# Patient Record
Sex: MALE | Race: WHITE | NOT HISPANIC OR LATINO | Employment: FULL TIME | URBAN - METROPOLITAN AREA
[De-identification: names, ages, dates, MRNs, and addresses within clinical notes are randomized per-mention and may not be internally consistent; named-entity substitution may affect disease eponyms.]

---

## 2022-05-25 ENCOUNTER — OFFICE VISIT (OUTPATIENT)
Dept: URGENT CARE | Facility: CLINIC | Age: 47
End: 2022-05-25
Payer: COMMERCIAL

## 2022-05-25 VITALS
DIASTOLIC BLOOD PRESSURE: 87 MMHG | SYSTOLIC BLOOD PRESSURE: 140 MMHG | HEIGHT: 70 IN | OXYGEN SATURATION: 98 % | BODY MASS INDEX: 35.79 KG/M2 | HEART RATE: 81 BPM | TEMPERATURE: 98 F | WEIGHT: 250 LBS | RESPIRATION RATE: 18 BRPM

## 2022-05-25 DIAGNOSIS — B34.9 ACUTE VIRAL SYNDROME: ICD-10-CM

## 2022-05-25 DIAGNOSIS — J02.9 ACUTE SORE THROAT: ICD-10-CM

## 2022-05-25 DIAGNOSIS — R09.81 COUGH WITH CONGESTION OF PARANASAL SINUS: ICD-10-CM

## 2022-05-25 DIAGNOSIS — Z20.822 OCCUPATIONAL EXPOSURE TO COVID-19 VIRUS: Primary | ICD-10-CM

## 2022-05-25 DIAGNOSIS — R09.82 POSTNASAL DRIP: ICD-10-CM

## 2022-05-25 DIAGNOSIS — R05.8 COUGH WITH CONGESTION OF PARANASAL SINUS: ICD-10-CM

## 2022-05-25 LAB
CTP QC/QA: YES
SARS-COV-2 RDRP RESP QL NAA+PROBE: NEGATIVE

## 2022-05-25 PROCEDURE — 3079F PR MOST RECENT DIASTOLIC BLOOD PRESSURE 80-89 MM HG: ICD-10-PCS | Mod: CPTII,S$GLB,, | Performed by: PHYSICIAN ASSISTANT

## 2022-05-25 PROCEDURE — U0002: ICD-10-PCS | Mod: QW,S$GLB,, | Performed by: PHYSICIAN ASSISTANT

## 2022-05-25 PROCEDURE — 4010F ACE/ARB THERAPY RXD/TAKEN: CPT | Mod: CPTII,S$GLB,, | Performed by: PHYSICIAN ASSISTANT

## 2022-05-25 PROCEDURE — 3008F PR BODY MASS INDEX (BMI) DOCUMENTED: ICD-10-PCS | Mod: CPTII,S$GLB,, | Performed by: PHYSICIAN ASSISTANT

## 2022-05-25 PROCEDURE — 3008F BODY MASS INDEX DOCD: CPT | Mod: CPTII,S$GLB,, | Performed by: PHYSICIAN ASSISTANT

## 2022-05-25 PROCEDURE — 3077F SYST BP >= 140 MM HG: CPT | Mod: CPTII,S$GLB,, | Performed by: PHYSICIAN ASSISTANT

## 2022-05-25 PROCEDURE — 1159F PR MEDICATION LIST DOCUMENTED IN MEDICAL RECORD: ICD-10-PCS | Mod: CPTII,S$GLB,, | Performed by: PHYSICIAN ASSISTANT

## 2022-05-25 PROCEDURE — 3077F PR MOST RECENT SYSTOLIC BLOOD PRESSURE >= 140 MM HG: ICD-10-PCS | Mod: CPTII,S$GLB,, | Performed by: PHYSICIAN ASSISTANT

## 2022-05-25 PROCEDURE — 99214 PR OFFICE/OUTPT VISIT, EST, LEVL IV, 30-39 MIN: ICD-10-PCS | Mod: S$GLB,,, | Performed by: PHYSICIAN ASSISTANT

## 2022-05-25 PROCEDURE — U0002 COVID-19 LAB TEST NON-CDC: HCPCS | Mod: QW,S$GLB,, | Performed by: PHYSICIAN ASSISTANT

## 2022-05-25 PROCEDURE — 1160F PR REVIEW ALL MEDS BY PRESCRIBER/CLIN PHARMACIST DOCUMENTED: ICD-10-PCS | Mod: CPTII,S$GLB,, | Performed by: PHYSICIAN ASSISTANT

## 2022-05-25 PROCEDURE — 3079F DIAST BP 80-89 MM HG: CPT | Mod: CPTII,S$GLB,, | Performed by: PHYSICIAN ASSISTANT

## 2022-05-25 PROCEDURE — 1159F MED LIST DOCD IN RCRD: CPT | Mod: CPTII,S$GLB,, | Performed by: PHYSICIAN ASSISTANT

## 2022-05-25 PROCEDURE — 1160F RVW MEDS BY RX/DR IN RCRD: CPT | Mod: CPTII,S$GLB,, | Performed by: PHYSICIAN ASSISTANT

## 2022-05-25 PROCEDURE — 99214 OFFICE O/P EST MOD 30 MIN: CPT | Mod: S$GLB,,, | Performed by: PHYSICIAN ASSISTANT

## 2022-05-25 PROCEDURE — 4010F PR ACE/ARB THEARPY RXD/TAKEN: ICD-10-PCS | Mod: CPTII,S$GLB,, | Performed by: PHYSICIAN ASSISTANT

## 2022-05-25 RX ORDER — METOPROLOL SUCCINATE 50 MG/1
TABLET, EXTENDED RELEASE ORAL
COMMUNITY

## 2022-05-25 RX ORDER — AMLODIPINE BESYLATE 10 MG/1
TABLET ORAL
COMMUNITY

## 2022-05-25 RX ORDER — GABAPENTIN 100 MG/1
CAPSULE ORAL
COMMUNITY

## 2022-05-25 RX ORDER — ATORVASTATIN CALCIUM 40 MG/1
TABLET, FILM COATED ORAL
COMMUNITY

## 2022-05-25 RX ORDER — LISINOPRIL 20 MG/1
TABLET ORAL
COMMUNITY

## 2022-05-25 NOTE — PATIENT INSTRUCTIONS
*Your COVID test was negative however, you have a known exposure:     [facility] COVID QUARANTINE   CDC Testing and Quarantine Guidelines for patients with exposure to a known-positive COVID-19 person:    *A 'close exposure' is defined as anyone who has had an exposure (masked or unmasked) to a known COVID -19 positive person within 6 feet of someone for a cumulative total of 15 minutes or more over a 24-hour period.    - Vaccinated Have been boosted or completed the primary series of Pfizer or Moderna vaccine within the last 6 months or completed the primary series of J&J vaccine within the last 2 months and/or had a positive test within 90 days-- do NOT need to quarantine after contact with someone who had COVID-19 unless they have symptoms.    - Fully vaccinated people who have not had a positive test within 90 days, should get tested 3-5 days after their exposure, even if they don't have symptoms and wear a mask indoors in public for 10 days following exposure or until their test result is negative on day 5. If you develop symptoms test and quarantine.    If you do decide to test at 5 days and are asymptomatic, the risk is that if you test without symptoms on Day 5 for example) and you are positive, your 5 day isolation begins on that day, and you turned your 5 day quarantine into 10 days.    If your exposure does not meet the above definition, you can return to your normal daily activities to include social distancing, wearing a mask and frequent handwashing.    Alternatively, if a 5-day quarantine is not feasible, it is imperative that an exposed person wear a well-fitting mask at all times when around others for 10 days after exposure.       SORE THROAT:    You may gargle with hot salt water 4 times a day for the next 2 days and then you may also gargle diluted hydrogen peroxide once to twice daily to alleviate some of your throat discomfort.  Drink plenty of fluids, recommend warm tea with honey.     YOU MAY USE  OVER-THE-COUNTER CEPACOL FOR SOOTHING OF YOUR THROAT.  You may wish to avoid spicy food, citrus fruits, and red sauces- as this may irritate the throat more.    You can also take a daily anti-histamine such as Zyrtec, Claritin, Xyzal, OR Allegra-IN DAYTIME; NON DROWSY) AND/OR Benadryl- AT NIGHT; DROWSY) to help with runny nose/sneezing/sore throat/cough.    If your symptoms do not improve, you should return to this clinic. If your symptoms worsen, go to the emergency room.     VIRAL URI: OVER THE COUNTER RECOMMENDATIONS/SUGGESTIONS--if needed    Remember to switch antihistamines every 3 months, if taken daily.     Make sure to stay well hydrated.    Use Nasal Saline to mechanically move any post nasal drip from your eustachian tube or from the back of your throat.    You may insert a whole garlic cloves into your nostrils and leave for 10-15 minutes. When you remove them, mucus will be pulled down. This may burn as garlic is strong.  Repeat as often as needed and able to tolerate.  Please do not use garlic if you have an allergy to garlic.    Use pseudoephedrine (behind the counter) to decongest. Pseudoephedrine  30 mg up to 240 mg /day. *BE AWARE- It can raise your blood pressure and give you palpitations.  Use mucinex (guaifenisin) to break up mucous up to 2400mg/day to loosen any mucous.   The mucinex DM pill has a cough suppressant that can be sedating. It can be used at night to stop the tickle at the back of your throat.  You can use Mucinex D (it has guaifenesin and a high dose of pseudoephedrine) in the mornings to help decongest.    Sometimes Nyquil at night is beneficial to help you get some rest, however it is sedating and it does have an antihistamine and tylenol.    Honey is a natural cough suppressant that can be used.    Tylenol up to 4,000 mg a day is safe for short periods and can be used for headache, body aches, pain, and fever. However in high doses and prolonged use it can cause liver  irritation.    Ibuprofen is a non-steroidal anti-inflammatory that can be used for headache, body aches, pain, and fever.However it can also cause stomach irritation if over used.        - You must understand that you have received an Urgent Care treatment only and that you may be released before all of your medical problems are known or treated.   - You, the patient, will arrange for follow up care as instructed with your primary care provider or recommended specialist.   - If your condition worsens or fails to improve we recommend that you receive another evaluation at the ER immediately or contact your PCP to discuss your concerns, or return here.   - Please do not drive or make any important decisions for 24 hours if you have received any pain medications, sedatives or mood altering drugs during your visit.    Disclaimer: This document was drafted with the use of a voice recognition device and is likely to have sound alike errors.

## 2022-05-25 NOTE — LETTER
[address] [facility], [address]  [place] [address]  Phone: [phone]          Return to Work/School    Patient: Husam Huerta  YOB: 1975   Date: 05/25/2022     To Whom It May Concern:     Husam Huerta was in contact with/seen in my office on 05/25/2022. COVID-19 is present in our communities across the state. There is limited testing for COVID at this time, so not all patients can be tested. In this situation, your employee meets the following criteria:     Husam Huerta has met the criteria for COVID-19 testing and has a NEGATIVE result. The employee can return to work once they are asymptomatic for 24 hours without the use of fever reducing medications (Tylenol, Motrin, etc).     If you have any questions or concerns, or if I can be of further assistance, please do not hesitate to contact me.     Sincerely,    Luisa Rosales PA-C

## 2022-05-25 NOTE — PROGRESS NOTES
"Subjective:       Patient ID: Husam Huerta is a 46 y.o. male.    Vitals:  height is 5' 10" (1.778 m) and weight is 113.4 kg (250 lb). His tympanic temperature is 97.9 °F (36.6 °C). His blood pressure is 140/87 (abnormal) and his pulse is 81. His respiration is 18 and oxygen saturation is 98%.     Chief Complaint: Cough    Pt exposed to covid through coworker yesterday.  Pt c/o cough, sore throat, sinus pressure, postnasal drip this morning.     Cough  This is a new problem. The current episode started today. The problem has been unchanged. The problem occurs hourly. The cough is non-productive. Associated symptoms include nasal congestion, postnasal drip and a sore throat. Pertinent negatives include no chest pain, chills, ear congestion, ear pain, fever, headaches, heartburn, hemoptysis, myalgias, rash, rhinorrhea, shortness of breath, sweats, weight loss or wheezing. Nothing aggravates the symptoms. He has tried nothing for the symptoms. The treatment provided no relief. There is no history of asthma, bronchiectasis, bronchitis, COPD, emphysema, environmental allergies or pneumonia.       Constitution: Negative for chills and fever.   HENT: Positive for congestion, postnasal drip and sore throat. Negative for ear pain.    Cardiovascular: Negative for chest pain.   Respiratory: Positive for cough. Negative for bloody sputum, shortness of breath and wheezing.    Gastrointestinal: Negative for heartburn.   Musculoskeletal: Negative for muscle ache.   Skin: Negative for rash.   Allergic/Immunologic: Negative for environmental allergies.   Neurological: Negative for headaches.       Objective:       Vitals:    05/25/22 1059   BP: (!) 140/87   Pulse: 81   Resp: 18   Temp: 97.9 °F (36.6 °C)   TempSrc: Tympanic   SpO2: 98%   Weight: 113.4 kg (250 lb)   Height: 5' 10" (1.778 m)       Physical Exam   Constitutional: He is oriented to person, place, and time. He appears well-developed. He is cooperative.  Non-toxic " appearance. He appears ill. No distress.   HENT:   Head: Normocephalic and atraumatic.   Ears:   Right Ear: Hearing, tympanic membrane, external ear and ear canal normal.   Left Ear: Hearing, tympanic membrane, external ear and ear canal normal.   Nose: Congestion present. No mucosal edema, rhinorrhea or nasal deformity. No epistaxis. Right sinus exhibits no maxillary sinus tenderness and no frontal sinus tenderness. Left sinus exhibits no maxillary sinus tenderness and no frontal sinus tenderness.   Mouth/Throat: Uvula is midline and mucous membranes are normal. Mucous membranes are moist. No trismus in the jaw. Normal dentition. No uvula swelling. Posterior oropharyngeal erythema present. No oropharyngeal exudate. Oropharynx is clear.   Eyes: Conjunctivae and lids are normal. Pupils are equal, round, and reactive to light. Right eye exhibits no discharge. Left eye exhibits no discharge. No scleral icterus. Extraocular movement intact   Neck: Trachea normal and phonation normal. Neck supple. No neck rigidity present.   Cardiovascular: Normal rate, regular rhythm, normal heart sounds and normal pulses.   Pulmonary/Chest: Effort normal and breath sounds normal. No stridor. No respiratory distress. He has no wheezes. He exhibits no tenderness.   Abdominal: Normal appearance and bowel sounds are normal. He exhibits no distension and no mass. Soft. There is no abdominal tenderness. There is no rebound and no guarding.   Musculoskeletal: Normal range of motion.         General: No deformity. Normal range of motion.      Right lower leg: No edema.      Left lower leg: No edema.   Lymphadenopathy:     He has no cervical adenopathy.   Neurological: no focal deficit. He is alert, oriented to person, place, and time and at baseline. He exhibits normal muscle tone. Coordination normal.   Skin: Skin is warm, dry, intact, not diaphoretic, not pale and no rash. Capillary refill takes less than 2 seconds.   Psychiatric: His speech  is normal and behavior is normal. Judgment and thought content normal.   Nursing note and vitals reviewed.        Assessment:       1. Occupational exposure to COVID-19 virus    2. Cough with congestion of paranasal sinus    3. Acute sore throat    4. Postnasal drip    5. Acute viral syndrome      No results found for this or any previous visit.      Plan:         Occupational exposure to COVID-19 virus    Cough with congestion of paranasal sinus  -     POCT COVID-19 Rapid Screening    Acute sore throat    Postnasal drip    Acute viral syndrome          Patient Instructions   *Your COVID test was negative however, you have a known exposure:     [facility] COVID QUARANTINE   CDC Testing and Quarantine Guidelines for patients with exposure to a known-positive COVID-19 person:    *A 'close exposure' is defined as anyone who has had an exposure (masked or unmasked) to a known COVID -19 positive person within 6 feet of someone for a cumulative total of 15 minutes or more over a 24-hour period.    - Vaccinated Have been boosted or completed the primary series of Pfizer or Moderna vaccine within the last 6 months or completed the primary series of J&J vaccine within the last 2 months and/or had a positive test within 90 days-- do NOT need to quarantine after contact with someone who had COVID-19 unless they have symptoms.    - Fully vaccinated people who have not had a positive test within 90 days, should get tested 3-5 days after their exposure, even if they don't have symptoms and wear a mask indoors in public for 10 days following exposure or until their test result is negative on day 5. If you develop symptoms test and quarantine.    - Unvaccinated, or are more than six months out from their second mRNA dose (or more than 2 months after the J&J vaccine) and not yet boosted,  and/or NOT had a positive test within 90 days and meet 'close exposure-- you are required by CDC guidelines to quarantine for at least 5 days from time  of exposure followed by 5 days of strict masking. It is recommended, but not required to test after 5 days, unless you develop symptoms, in which case you should test at that time.    If you do decide to test at 5 days and are asymptomatic, the risk is that if you test without symptoms on Day 5 for example) and you are positive, your 5 day isolation begins on that day, and you turned your 5 day quarantine into 10 days.    If your exposure does not meet the above definition, you can return to your normal daily activities to include social distancing, wearing a mask and frequent handwashing.    Alternatively, if a 5-day quarantine is not feasible, it is imperative that an exposed person wear a well-fitting mask at all times when around others for 10 days after exposure.       SORE THROAT:    You may gargle with hot salt water 4 times a day for the next 2 days and then you may also gargle diluted hydrogen peroxide once to twice daily to alleviate some of your throat discomfort.  Drink plenty of fluids, recommend warm tea with honey.     YOU MAY USE OVER-THE-COUNTER CEPACOL FOR SOOTHING OF YOUR THROAT.  You may wish to avoid spicy food, citrus fruits, and red sauces- as this may irritate the throat more.    You can also take a daily anti-histamine such as Zyrtec, Claritin, Xyzal, OR Allegra-IN DAYTIME; NON DROWSY) AND/OR Benadryl- AT NIGHT; DROWSY) to help with runny nose/sneezing/sore throat/cough.    If your symptoms do not improve, you should return to this clinic. If your symptoms worsen, go to the emergency room.     VIRAL URI: OVER THE COUNTER RECOMMENDATIONS/SUGGESTIONS--if needed    Remember to switch antihistamines every 3 months, if taken daily.     Make sure to stay well hydrated.    Use Nasal Saline to mechanically move any post nasal drip from your eustachian tube or from the back of your throat.    You may insert a whole garlic cloves into your nostrils and leave for 10-15 minutes. When you remove them,  mucus will be pulled down. This may burn as garlic is strong.  Repeat as often as needed and able to tolerate.  Please do not use garlic if you have an allergy to garlic.    Use pseudoephedrine (behind the counter) to decongest. Pseudoephedrine  30 mg up to 240 mg /day. *BE AWARE- It can raise your blood pressure and give you palpitations.  Use mucinex (guaifenisin) to break up mucous up to 2400mg/day to loosen any mucous.   The mucinex DM pill has a cough suppressant that can be sedating. It can be used at night to stop the tickle at the back of your throat.  You can use Mucinex D (it has guaifenesin and a high dose of pseudoephedrine) in the mornings to help decongest.    Sometimes Nyquil at night is beneficial to help you get some rest, however it is sedating and it does have an antihistamine and tylenol.    Honey is a natural cough suppressant that can be used.    Tylenol up to 4,000 mg a day is safe for short periods and can be used for headache, body aches, pain, and fever. However in high doses and prolonged use it can cause liver irritation.    Ibuprofen is a non-steroidal anti-inflammatory that can be used for headache, body aches, pain, and fever.However it can also cause stomach irritation if over used.        - You must understand that you have received an Urgent Care treatment only and that you may be released before all of your medical problems are known or treated.   - You, the patient, will arrange for follow up care as instructed with your primary care provider or recommended specialist.   - If your condition worsens or fails to improve we recommend that you receive another evaluation at the ER immediately or contact your PCP to discuss your concerns, or return here.   - Please do not drive or make any important decisions for 24 hours if you have received any pain medications, sedatives or mood altering drugs during your visit.    Disclaimer: This document was drafted with the use of a voice  recognition device and is likely to have sound alike errors.         Medical Decision Making:   Initial Assessment:   Too early for COVID test which was negative  --quarantine instructions discussed

## 2022-12-03 ENCOUNTER — OFFICE VISIT (OUTPATIENT)
Dept: URGENT CARE | Facility: CLINIC | Age: 47
End: 2022-12-03
Payer: COMMERCIAL

## 2022-12-03 VITALS
BODY MASS INDEX: 37.03 KG/M2 | HEIGHT: 69 IN | SYSTOLIC BLOOD PRESSURE: 144 MMHG | WEIGHT: 250 LBS | RESPIRATION RATE: 18 BRPM | HEART RATE: 101 BPM | OXYGEN SATURATION: 96 % | DIASTOLIC BLOOD PRESSURE: 90 MMHG | TEMPERATURE: 101 F

## 2022-12-03 DIAGNOSIS — J10.1 INFLUENZA A: Primary | ICD-10-CM

## 2022-12-03 DIAGNOSIS — R05.1 ACUTE COUGH: ICD-10-CM

## 2022-12-03 LAB
CTP QC/QA: YES
CTP QC/QA: YES
POC MOLECULAR INFLUENZA A AGN: POSITIVE
POC MOLECULAR INFLUENZA B AGN: NEGATIVE
SARS-COV-2 RDRP RESP QL NAA+PROBE: NEGATIVE

## 2022-12-03 PROCEDURE — 3077F SYST BP >= 140 MM HG: CPT | Mod: CPTII,S$GLB,, | Performed by: PHYSICIAN ASSISTANT

## 2022-12-03 PROCEDURE — 87635: ICD-10-PCS | Mod: QW,S$GLB,, | Performed by: PHYSICIAN ASSISTANT

## 2022-12-03 PROCEDURE — 99214 PR OFFICE/OUTPT VISIT, EST, LEVL IV, 30-39 MIN: ICD-10-PCS | Mod: S$GLB,,, | Performed by: PHYSICIAN ASSISTANT

## 2022-12-03 PROCEDURE — 87502 POCT INFLUENZA A/B MOLECULAR: ICD-10-PCS | Mod: QW,S$GLB,, | Performed by: PHYSICIAN ASSISTANT

## 2022-12-03 PROCEDURE — 4010F PR ACE/ARB THEARPY RXD/TAKEN: ICD-10-PCS | Mod: CPTII,S$GLB,, | Performed by: PHYSICIAN ASSISTANT

## 2022-12-03 PROCEDURE — 4010F ACE/ARB THERAPY RXD/TAKEN: CPT | Mod: CPTII,S$GLB,, | Performed by: PHYSICIAN ASSISTANT

## 2022-12-03 PROCEDURE — 3077F PR MOST RECENT SYSTOLIC BLOOD PRESSURE >= 140 MM HG: ICD-10-PCS | Mod: CPTII,S$GLB,, | Performed by: PHYSICIAN ASSISTANT

## 2022-12-03 PROCEDURE — 1160F PR REVIEW ALL MEDS BY PRESCRIBER/CLIN PHARMACIST DOCUMENTED: ICD-10-PCS | Mod: CPTII,S$GLB,, | Performed by: PHYSICIAN ASSISTANT

## 2022-12-03 PROCEDURE — 1160F RVW MEDS BY RX/DR IN RCRD: CPT | Mod: CPTII,S$GLB,, | Performed by: PHYSICIAN ASSISTANT

## 2022-12-03 PROCEDURE — 87635 SARS-COV-2 COVID-19 AMP PRB: CPT | Mod: QW,S$GLB,, | Performed by: PHYSICIAN ASSISTANT

## 2022-12-03 PROCEDURE — 1159F PR MEDICATION LIST DOCUMENTED IN MEDICAL RECORD: ICD-10-PCS | Mod: CPTII,S$GLB,, | Performed by: PHYSICIAN ASSISTANT

## 2022-12-03 PROCEDURE — 1159F MED LIST DOCD IN RCRD: CPT | Mod: CPTII,S$GLB,, | Performed by: PHYSICIAN ASSISTANT

## 2022-12-03 PROCEDURE — 99214 OFFICE O/P EST MOD 30 MIN: CPT | Mod: S$GLB,,, | Performed by: PHYSICIAN ASSISTANT

## 2022-12-03 PROCEDURE — 3008F PR BODY MASS INDEX (BMI) DOCUMENTED: ICD-10-PCS | Mod: CPTII,S$GLB,, | Performed by: PHYSICIAN ASSISTANT

## 2022-12-03 PROCEDURE — 3008F BODY MASS INDEX DOCD: CPT | Mod: CPTII,S$GLB,, | Performed by: PHYSICIAN ASSISTANT

## 2022-12-03 PROCEDURE — 3080F PR MOST RECENT DIASTOLIC BLOOD PRESSURE >= 90 MM HG: ICD-10-PCS | Mod: CPTII,S$GLB,, | Performed by: PHYSICIAN ASSISTANT

## 2022-12-03 PROCEDURE — 87502 INFLUENZA DNA AMP PROBE: CPT | Mod: QW,S$GLB,, | Performed by: PHYSICIAN ASSISTANT

## 2022-12-03 PROCEDURE — 3080F DIAST BP >= 90 MM HG: CPT | Mod: CPTII,S$GLB,, | Performed by: PHYSICIAN ASSISTANT

## 2022-12-03 RX ORDER — BENZONATATE 200 MG/1
200 CAPSULE ORAL 3 TIMES DAILY PRN
Qty: 21 CAPSULE | Refills: 0 | Status: SHIPPED | OUTPATIENT
Start: 2022-12-03 | End: 2022-12-10

## 2022-12-03 RX ORDER — OSELTAMIVIR PHOSPHATE 75 MG/1
75 CAPSULE ORAL 2 TIMES DAILY
Qty: 10 CAPSULE | Refills: 0 | Status: SHIPPED | OUTPATIENT
Start: 2022-12-03 | End: 2022-12-08

## 2022-12-03 NOTE — PROGRESS NOTES
"Subjective:       Patient ID: Husam Huerta is a 46 y.o. male.    Vitals:  height is 5' 9" (1.753 m) and weight is 113.4 kg (250 lb). His oral temperature is 101.1 °F (38.4 °C) (abnormal). His blood pressure is 144/90 (abnormal) and his pulse is 101. His respiration is 18 and oxygen saturation is 96%.     Chief Complaint: Cough    Pt c/o eye pressure, headache, nasal congestion, stuffy head/sinus pressure, cough, postnasal drip starting yesterday.     Cough  This is a new problem. The current episode started yesterday. The problem has been gradually worsening. The problem occurs every few hours. The cough is Non-productive. Associated symptoms include chills, a fever, headaches, myalgias, nasal congestion and postnasal drip. Pertinent negatives include no chest pain, ear congestion, ear pain, heartburn, hemoptysis, rash, rhinorrhea, sore throat, shortness of breath, sweats, weight loss or wheezing. Nothing aggravates the symptoms. Risk factors for lung disease include smoking/tobacco exposure and animal exposure. Treatments tried: nyquil, zithromax. The treatment provided no relief. There is no history of asthma, bronchiectasis, bronchitis, COPD, emphysema, environmental allergies or pneumonia.     Constitution: Positive for chills and fever.   HENT:  Positive for postnasal drip. Negative for ear pain and sore throat.    Cardiovascular:  Negative for chest pain.   Respiratory:  Positive for cough. Negative for bloody sputum, shortness of breath and wheezing.    Gastrointestinal:  Negative for heartburn.   Musculoskeletal:  Positive for muscle ache.   Skin:  Negative for rash.   Allergic/Immunologic: Negative for environmental allergies.   Neurological:  Positive for headaches.     Objective:      Physical Exam   Constitutional: He is oriented to person, place, and time. He appears well-developed. He is cooperative.  Non-toxic appearance. He does not appear ill. No distress.   HENT:   Head: Normocephalic and " atraumatic.   Ears:   Right Ear: Hearing, tympanic membrane, external ear and ear canal normal.   Left Ear: Hearing, tympanic membrane, external ear and ear canal normal.   Nose: Nose normal. No mucosal edema, rhinorrhea or nasal deformity. No epistaxis. Right sinus exhibits no maxillary sinus tenderness and no frontal sinus tenderness. Left sinus exhibits no maxillary sinus tenderness and no frontal sinus tenderness.   Mouth/Throat: Uvula is midline, oropharynx is clear and moist and mucous membranes are normal. No trismus in the jaw. Normal dentition. No uvula swelling. No posterior oropharyngeal erythema.   Eyes: Conjunctivae and lids are normal. Right eye exhibits no discharge. Left eye exhibits no discharge. No scleral icterus.   Neck: Trachea normal and phonation normal. Neck supple.   Cardiovascular: Normal rate, regular rhythm, normal heart sounds and normal pulses.   Pulmonary/Chest: Effort normal and breath sounds normal. No respiratory distress. He has no wheezes. He has no rhonchi.   Abdominal: Normal appearance and bowel sounds are normal. He exhibits no distension and no mass. Soft. There is no abdominal tenderness.   Musculoskeletal: Normal range of motion.         General: No deformity. Normal range of motion.   Neurological: He is alert and oriented to person, place, and time. He exhibits normal muscle tone. Coordination normal.   Skin: Skin is warm, dry, intact, not diaphoretic and not pale.   Psychiatric: His speech is normal and behavior is normal. Judgment and thought content normal.   Nursing note and vitals reviewed.      Assessment:       1. Influenza A    2. Acute cough        Results for orders placed or performed in visit on 12/03/22   POCT COVID-19 Rapid Screening   Result Value Ref Range    POC Rapid COVID Negative Negative     Acceptable Yes    POCT Influenza A/B MOLECULAR   Result Value Ref Range    POC Molecular Influenza A Ag Positive (A) Negative, Not Reported    POC  Molecular Influenza B Ag Negative Negative, Not Reported     Acceptable Yes        Plan:       Discussed medication being prescribed.  Advised patient to follow up with PCP as needed.  Patient verbalized understanding, agrees with the plan, and is comfortable with discharge.    Influenza A  -     oseltamivir (TAMIFLU) 75 MG capsule; Take 1 capsule (75 mg total) by mouth 2 (two) times daily. for 5 days  Dispense: 10 capsule; Refill: 0    Acute cough  -     POCT COVID-19 Rapid Screening  -     benzonatate (TESSALON) 200 MG capsule; Take 1 capsule (200 mg total) by mouth 3 (three) times daily as needed for Cough.  Dispense: 21 capsule; Refill: 0  -     POCT Influenza A/B MOLECULAR

## 2022-12-06 ENCOUNTER — TELEPHONE (OUTPATIENT)
Dept: URGENT CARE | Facility: CLINIC | Age: 47
End: 2022-12-06
Payer: COMMERCIAL

## 2025-04-24 ENCOUNTER — OFFICE VISIT (OUTPATIENT)
Dept: URGENT CARE | Facility: CLINIC | Age: 50
End: 2025-04-24
Payer: COMMERCIAL

## 2025-04-24 VITALS
RESPIRATION RATE: 20 BRPM | DIASTOLIC BLOOD PRESSURE: 77 MMHG | SYSTOLIC BLOOD PRESSURE: 129 MMHG | BODY MASS INDEX: 37.22 KG/M2 | HEART RATE: 88 BPM | HEIGHT: 70 IN | OXYGEN SATURATION: 95 % | TEMPERATURE: 98 F | WEIGHT: 260 LBS

## 2025-04-24 DIAGNOSIS — M54.50 ACUTE LOW BACK PAIN WITHOUT SCIATICA, UNSPECIFIED BACK PAIN LATERALITY: Primary | ICD-10-CM

## 2025-04-24 LAB
BILIRUBIN, UA POC OHS: NEGATIVE
BLOOD, UA POC OHS: ABNORMAL
CLARITY, UA POC OHS: ABNORMAL
COLOR, UA POC OHS: ABNORMAL
GLUCOSE, UA POC OHS: NEGATIVE
KETONES, UA POC OHS: NEGATIVE
LEUKOCYTES, UA POC OHS: NEGATIVE
NITRITE, UA POC OHS: NEGATIVE
PH, UA POC OHS: 6.5
PROTEIN, UA POC OHS: ABNORMAL
SPECIFIC GRAVITY, UA POC OHS: 1.02
UROBILINOGEN, UA POC OHS: 0.2

## 2025-04-24 RX ORDER — PREDNISONE 20 MG/1
60 TABLET ORAL
Status: COMPLETED | OUTPATIENT
Start: 2025-04-24 | End: 2025-04-24

## 2025-04-24 RX ORDER — KETOROLAC TROMETHAMINE 30 MG/ML
30 INJECTION, SOLUTION INTRAMUSCULAR; INTRAVENOUS
Status: COMPLETED | OUTPATIENT
Start: 2025-04-24 | End: 2025-04-24

## 2025-04-24 RX ORDER — KETOROLAC TROMETHAMINE 30 MG/ML
2 INJECTION, SOLUTION INTRAMUSCULAR; INTRAVENOUS
COMMUNITY

## 2025-04-24 RX ORDER — HYDROCODONE BITARTRATE AND ACETAMINOPHEN 5; 325 MG/1; MG/1
1 TABLET ORAL EVERY 6 HOURS PRN
Qty: 12 TABLET | Refills: 0 | Status: SHIPPED | OUTPATIENT
Start: 2025-04-24

## 2025-04-24 RX ADMIN — PREDNISONE 60 MG: 20 TABLET ORAL at 01:04

## 2025-04-24 RX ADMIN — KETOROLAC TROMETHAMINE 30 MG: 30 INJECTION, SOLUTION INTRAMUSCULAR; INTRAVENOUS at 01:04

## 2025-04-24 NOTE — PROGRESS NOTES
"Subjective:      Patient ID: Husam Huerta is a 49 y.o. male.    Vitals:  height is 5' 10" (1.778 m) and weight is 117.9 kg (260 lb). His oral temperature is 98.3 °F (36.8 °C). His blood pressure is 129/77 and his pulse is 88. His respiration is 20 and oxygen saturation is 95%.     Chief Complaint: Back Pain    Patient presents with left lower back pain that began 4/21/25. He was moving a dresser on 4/20/25 and thinks that is what caused this flare.  Pt took [place], pain level is 8/10.  Denies numbness/tingling, radiation of pain, and/or any other symptoms associated with this complaint.     Back Pain  This is a recurrent problem. The current episode started in the past 7 days. The problem has been rapidly worsening since onset. The pain is present in the sacro-iliac and lumbar spine. The quality of the pain is described as aching, burning, shooting and stabbing. The pain radiates to the left thigh. The pain is at a severity of 8/10. The pain is mild. The pain is The same all the time. The symptoms are aggravated by standing, twisting, sitting and position. Stiffness is present All day. Associated symptoms include pelvic pain and tingling. Pertinent negatives include no abdominal pain, bladder incontinence, bowel incontinence, chest pain, dysuria, fever, headaches, leg pain, numbness, paresis, paresthesias, perianal numbness, weakness or weight loss. The treatment provided moderate relief.       Constitution: Negative for fever.   Cardiovascular:  Negative for chest pain.   Gastrointestinal:  Negative for abdominal pain and bowel incontinence.   Genitourinary:  Positive for pelvic pain. Negative for dysuria and bladder incontinence.   Musculoskeletal:  Positive for back pain.   Neurological:  Negative for headaches and numbness.      Objective:     Physical Exam   Constitutional: He is oriented to person, place, and time. He appears well-developed.   HENT:   Head: Normocephalic and atraumatic.   Ears:   Right Ear: " External ear normal.   Left Ear: External ear normal.   Nose: Nose normal.   Mouth/Throat: Oropharynx is clear and moist.   Eyes: Conjunctivae, EOM and lids are normal.   Neck: Trachea normal and phonation normal. Neck supple.   Cardiovascular: Normal rate.   Pulmonary/Chest: Effort normal.   Musculoskeletal: Normal range of motion.         General: Normal range of motion.      Thoracic back: Normal.      Lumbar back: He exhibits no bony tenderness.        Back:    Neurological: He is alert and oriented to person, place, and time.   Skin: Skin is warm, dry and intact.   Psychiatric: His speech is normal and behavior is normal. Judgment and thought content normal.   Nursing note and vitals reviewed.      Assessment:     1. Acute low back pain without sciatica, unspecified back pain laterality      Results for orders placed or performed in visit on 04/24/25   POCT Urinalysis(Instrument)    Collection Time: 04/24/25  1:28 PM   Result Value Ref Range    Color, POC UA Tierra (A) Yellow, Straw, Colorless    Clarity, POC UA Cloudy (A) Clear    Glucose, POC UA Negative Negative    Bilirubin, POC UA Negative Negative    Ketones, POC UA Negative Negative    Spec Grav POC UA 1.025 1.005 - 1.030    Blood, POC UA Trace-intact (A) Negative    pH, POC UA 6.5 5.0 - 8.0    Protein, POC UA Trace (A) Negative    Urobilinogen, POC UA 0.2 <=1.0    Nitrite, POC UA Negative Negative    WBC, POC UA Negative Negative       Plan:   VSS. Patient non-toxic appearing. Discussed medication being prescribed.  Advised patient to follow up with PCP as needed.  Patient verbalized understanding, agrees with the plan, and is comfortable with discharge.      Acute low back pain without sciatica, unspecified back pain laterality  -     POCT Urinalysis(Instrument)  -     ketorolac injection 30 mg  -     predniSONE tablet 60 mg  -     HYDROcodone-acetaminophen (NORCO) 5-325 mg per tablet; Take 1 tablet by mouth every 6 (six) hours as needed for Pain.   Dispense: 12 tablet; Refill: 0      Medical Decision Making:   Clinical Tests:   Lab Tests: Ordered and Reviewed  The following lab test(s) were unremarkable: Urinalysis

## 2025-05-12 DIAGNOSIS — M48.062 SPINAL STENOSIS, LUMBAR REGION, WITH NEUROGENIC CLAUDICATION: ICD-10-CM

## 2025-05-12 DIAGNOSIS — M54.50 LOW BACK PAIN: Primary | ICD-10-CM

## 2025-05-12 DIAGNOSIS — M54.16 RADICULOPATHY, LUMBAR REGION: ICD-10-CM

## 2025-05-15 ENCOUNTER — CLINICAL SUPPORT (OUTPATIENT)
Dept: REHABILITATION | Facility: HOSPITAL | Age: 50
End: 2025-05-15
Payer: COMMERCIAL

## 2025-05-15 DIAGNOSIS — G89.29 CHRONIC BILATERAL LOW BACK PAIN WITHOUT SCIATICA: Primary | ICD-10-CM

## 2025-05-15 DIAGNOSIS — M54.50 CHRONIC BILATERAL LOW BACK PAIN WITHOUT SCIATICA: Primary | ICD-10-CM

## 2025-05-15 PROCEDURE — 97162 PT EVAL MOD COMPLEX 30 MIN: CPT | Mod: PN

## 2025-05-15 PROCEDURE — 97110 THERAPEUTIC EXERCISES: CPT | Mod: PN

## 2025-05-15 NOTE — LETTER
May 16, 2025  Ladan Monroe MD  [address]  [address]  [place] [address]    To whom it may concern,     The attached plan of care is being sent to you for review and reference.    You may indicate your approval by signing the document electronically, or by faxing/mailing a signed copy of the final page of this document back to the attention of Mayela Rodriguez, PT:         Plan of Care 5/16/25   Effective from: 5/16/2025  Effective to: 7/11/2025    Plan ID: [id number]            Participants as of Finalize on 5/16/2025    Name Type Comments Contact Info    Ladan Monroe MD Referring Provider  [phone]    Mayela Rodriguez PT Physical Therapist  [phone]       Last Plan Note     Author: Mayela Rodriguez PT Status: Signed Last edited: 5/15/2025 10:00 AM       Outpatient Rehab  Physical Therapy Evaluation    Patient Name: Husam Huerta  MRN: [id number]  YOB: 1975  Encounter Date: 5/15/2025    Therapy Diagnosis:   Encounter Diagnosis   Name Primary?    Chronic bilateral low back pain without sciatica Yes     Physician: Ladan Monroe MD    Physician Orders: Eval and Treat  Medical Diagnosis: Low back pain  Radiculopathy, lumbar region  Spinal stenosis, lumbar region, with neurogenic claudication    Visit # / Visits Authorized:  1 / 1  Insurance Authorization Period: 5/12/2025 to 12/31/2025  Date of Evaluation: 5/15/2025  Plan of Care Certification: 5/15/2025 to 7/11/2025     Time In: 1000   Time Out: 1100  Total Time (in minutes): 60   Total Billable Time (in minutes):  60 minutes    Intake Outcome Measure for FOTO Survey  Therapist reviewed FOTO scores for Husam Huerta on 5/15/2025.   FOTO report - see Media section or FOTO account episode details.   Intake Score: 49%    Precautions: Standard     Subjective   History of Present Illness  Husam is a 49 y.o. male who reports to physical therapy with a chief concern of Bilateral low back pain.         Diagnostic tests related to this  "condition: None.      Dominant Hand: Right  History of Present Condition/Illness: He reports that he is coming to physical therapy to get further imaging done. He reports that he hurt his back 20 years ago. He was digging a ditch and he hit bricks and the machine came out of the ditch and he was still holding onto the machine. He was on the couch for two weeks due to much pain and was told he has two torn discs. He had some at home therapy for a few days and was able to function. The more he did the more it would hurt. Has been good the last 9 months, but somewhere around [facility] he helped moved some stuff and aggravated his back again. States he could barely move and had a hard time getting up/out of the bed, getting dressed, moving around, land laying down. He has bilateral low back pain, right greater than left and no radiating symptoms. He reports that if he pushes it, he may get some radiating symptoms into RLE but does not go into foot. Today he reports that the hardest thing to do is to stand with a slight bend, such as: doing dishes, brushing teeth, shaving. He also has increased pain with prolonged sitting, prolonged standing, and can occastionally wake him up at night on flare ups he is unable to sleep.     Activities of Daily Living  Social history was obtained from Patient.       Previously independent with activities of daily living? Yes  Currently independent with activities of daily living? Yes     Previously independent with instrumental activities of daily living? Yes  Currently independent with instrumental activities of daily living? Yes        Pain  Patient reports a current pain level of 3/10. Pain at best is reported as 3/10. Pain at worst is reported as 9/10.   Location: bilateral low back pain (R>L)  Pain Qualities: Aching, Burning, Dull, Sharp, Other (Comment)  Other Pain Qualities: shooting, "nail stuck in back"  Pain-Relieving Factors: Lying down, Rest, Stretching  Pain-Aggravating Factors: " "Bending, Cooking, Exercise, Lifting, Rotation, Sitting, Sleeping, Squatting, Stair climbing, Standing, Walking     Living Arrangements  Living Situation  Housing: Home independently  Living Arrangements: Spouse/significant other    Home Setup  Type of Structure: House    Employment  Employment Status: Employed full-time    Sales- standing and sitting and walking    Past Medical History/Physical Systems Review:   Husam Huerta  has a past medical history of Hyperlipidemia and Hypertension.    Husam Huerta  has no past surgical history on file.    Husam has a current medication list which includes the following prescription(s): amlodipine, atorvastatin, gabapentin, hydrocodone-acetaminophen, ketorolac, lisinopril, and metoprolol succinate.    Review of patient's allergies indicates:  No Known Allergies     Objective      Lower Extremity Sensation  General Lumbar/Lower Extremity Sensation  Intact: Right and Left      Spinal Muscle Palpation  Slight tenderness noted in right lumbar paraspinals and QL       Lumbar Range of Motion   Active (%) Pain   Flexion 75   Haleigh's sign  Pain at 50% of the 75% range of motion    Extension 100   tightness   Right Lateral Flexion 100   Left tightness   Right Rotation 100     Left Lateral Flexion 100   Right tightness   Left Rotation 100          Single Leg Balance Test  Right Foot: greater than 15 sec, increased right lateral trunk lean   Left Foot: greater than 15 sec    Functional Mobility Test   Step downs: no pain, deviation, or compensation bilaterally       Treatment:  Therapeutic Exercise  TE 1: LTR 1x10  TE 2: Single knee to chest 20x  TE 3: piriformis stretch 3x30"  TE 4: Hamstring stretch 3x30"  TE 5: open book 1x10  TE 6: PPT 10", 2x10  TE 7: PPT with march 2x10  TE 8: Deadbugs 1x10  TE 9: birddogs 1x10       Assessment & Plan   Assessment  Husam presents with a condition of Moderate complexity.   Presentation of Symptoms: Uncomplicated  Will Comorbidities Impact Care: " No     Functional Limitations: Activity tolerance, Disrupted sleep pattern, Pain with ADLs/IADLs, Painful locomotion/ambulation, Participating in leisure activities, Range of motion, Sitting tolerance, Standing tolerance, Functional mobility  Impairments: Abnormal or restricted range of motion, Impaired physical strength, Pain with functional activity  Personal Factors Affecting Prognosis: Pain    Patient Goal for Therapy (PT): decreased pain  Prognosis: Good    Plan  From a physical therapy perspective, the patient would benefit from: Skilled Rehab Services    Planned therapy interventions include: Therapeutic exercise, Therapeutic activities, Neuromuscular re-education, and Manual therapy.    Planned modalities to include: Electrical stimulation - attended, Cryotherapy (cold pack), Electrical stimulation - passive/unattended, and Thermotherapy (hot pack).        Visit Frequency: 2 times Per Week for 8 Weeks.  This plan was discussed with Patient.   Discussion participants: Agreed Upon Plan of Care    Patient's spiritual, cultural, and educational needs considered and patient agreeable to plan of care and goals.       Goals:   Active       Long Term Goals       Patient will demonstrate improved function as indicated by a functional score of 66 on the FOTO.        Start:  05/15/25    Expected End:  07/11/25            Pt will demonstrate improved pain by reports of less than or equal to 5/10 worst pain on the verbal rating scale in order to progress toward maximal functional ability and improve QOL.         Start:  05/15/25    Expected End:  07/11/25            Patient will improve lumbar range of motion to within normal limits with less than 3/10 pain for improved functional QOL.        Start:  05/15/25    Expected End:  07/11/25            Patient will improve bilateral lower extremity strength to 5/5 MMT for return to prior level of function.        Start:  05/15/25    Expected End:  07/11/25               Short  Term Goals       Patient will demonstrate independence with HEP in order to progress toward functional independence        Start:  05/15/25    Expected End:  07/11/25            Pt will demonstrate improved pain by reports of less than or equal to 7/10 worst pain on the verbal rating scale in order to progress toward maximal functional ability and improve QOL.         Start:  05/15/25    Expected End:  07/11/25            Patient will reports decreased pain in the mornings for improved functional QOL       Start:  05/15/25    Expected End:  07/11/25                Mayela Rodriguez PT         Current Participants as of 5/16/2025    Name Type Comments Contact Info    Ladan Monroe MD Referring Provider  [phone]    Signature pending    Mayela Rodriguez PT Physical Therapist  [phone]                Sincerely,      Mayela Rodriguez PT  [facility]                                                            Dear Mayela Rodriguez PT,    RE: Mr. Husam Huerta, MRN: [id number]    I certify that I have reviewed the attached plan of care and agree to the details within.        ___________________________  ___________________________  Provider Printed Name   Provider Signed Name      ___________________________  Date and Time

## 2025-05-16 PROBLEM — M54.50 CHRONIC BILATERAL LOW BACK PAIN WITHOUT SCIATICA: Status: ACTIVE | Noted: 2025-05-16

## 2025-05-16 PROBLEM — G89.29 CHRONIC BILATERAL LOW BACK PAIN WITHOUT SCIATICA: Status: ACTIVE | Noted: 2025-05-16

## 2025-05-16 NOTE — PROGRESS NOTES
Outpatient Rehab  Physical Therapy Evaluation    Patient Name: Husam Huerta  MRN: [id number]  YOB: 1975  Encounter Date: 5/15/2025    Therapy Diagnosis:   Encounter Diagnosis   Name Primary?    Chronic bilateral low back pain without sciatica Yes     Physician: Ladan Monroe MD    Physician Orders: Eval and Treat  Medical Diagnosis: Low back pain  Radiculopathy, lumbar region  Spinal stenosis, lumbar region, with neurogenic claudication    Visit # / Visits Authorized:  1 / 1  Insurance Authorization Period: 5/12/2025 to 12/31/2025  Date of Evaluation: 5/15/2025  Plan of Care Certification: 5/15/2025 to 7/11/2025     Time In: 1000   Time Out: 1100  Total Time (in minutes): 60   Total Billable Time (in minutes):  60 minutes    Intake Outcome Measure for FOTO Survey  Therapist reviewed FOTO scores for Husam Huerta on 5/15/2025.   FOTO report - see Media section or FOTO account episode details.   Intake Score: 49%    Precautions: Standard     Subjective   History of Present Illness  Husam is a 49 y.o. male who reports to physical therapy with a chief concern of Bilateral low back pain.         Diagnostic tests related to this condition: None.      Dominant Hand: Right  History of Present Condition/Illness: He reports that he is coming to physical therapy to get further imaging done. He reports that he hurt his back 20 years ago. He was digging a ditch and he hit bricks and the machine came out of the ditch and he was still holding onto the machine. He was on the couch for two weeks due to much pain and was told he has two torn discs. He had some at home therapy for a few days and was able to function. The more he did the more it would hurt. Has been good the last 9 months, but [place] stuff and aggravated his back again. States he could barely move and had a hard time getting up/out of the bed, getting dressed, moving around, land laying down. He has bilateral low  "back pain, right greater than left and no radiating symptoms. He reports that if he pushes it, he may get some radiating symptoms into RLE but does not go into foot. Today he reports that the hardest thing to do is to stand with a slight bend, such as: doing dishes, brushing teeth, shaving. He also has increased pain with prolonged sitting, prolonged standing, and can occastionally wake him up at night on flare ups he is unable to sleep.     Activities of Daily Living  Social history was obtained from Patient.       Previously independent with activities of daily living? Yes  Currently independent with activities of daily living? Yes     Previously independent with instrumental activities of daily living? Yes  Currently independent with instrumental activities of daily living? Yes        Pain  Patient reports a current pain level of 3/10. Pain at best is reported as 3/10. Pain at worst is reported as 9/10.   Location: bilateral low back pain (R>L)  Pain Qualities: Aching, Burning, Dull, Sharp, Other (Comment)  Other Pain Qualities: shooting, "nail stuck in back"  Pain-Relieving Factors: Lying down, Rest, Stretching  Pain-Aggravating Factors: Bending, Cooking, Exercise, Lifting, Rotation, Sitting, Sleeping, Squatting, Stair climbing, Standing, Walking     Living Arrangements  Living Situation  Housing: Home independently  Living Arrangements: Spouse/significant other    Home Setup  Type of Structure: House    Employment  Employment Status: Employed full-time    Sales- standing and sitting and walking    Past Medical History/Physical Systems Review:   Husam Huerta  has a past medical history of Hyperlipidemia and Hypertension.    Husam Huerta  has no past surgical history on file.    Husam has a current medication list which includes the following prescription(s): amlodipine, atorvastatin, gabapentin, hydrocodone-acetaminophen, ketorolac, lisinopril, and metoprolol succinate.    Review of patient's allergies " "indicates:  No Known Allergies     Objective      Lower Extremity Sensation  General Lumbar/Lower Extremity Sensation  Intact: Right and Left      Spinal Muscle Palpation  Slight tenderness noted in right lumbar paraspinals and QL       Lumbar Range of Motion   Active (%) Pain   Flexion 75   Blair's sign  Pain at 50% of the 75% range of motion    Extension 100   tightness   Right Lateral Flexion 100   Left tightness   Right Rotation 100     Left Lateral Flexion 100   Right tightness   Left Rotation 100          Single Leg Balance Test  Right Foot: greater than 15 sec, increased right lateral trunk lean   Left Foot: greater than 15 sec    Functional Mobility Test   Step downs: no pain, deviation, or compensation bilaterally       Treatment:  Therapeutic Exercise  TE 1: LTR 1x10  TE 2: Single knee to chest 20x  TE 3: piriformis stretch 3x30"  TE 4: Hamstring stretch 3x30"  TE 5: open book 1x10  TE 6: PPT 10", 2x10  TE 7: PPT with march 2x10  TE 8: Deadbugs 1x10  TE 9: birddogs 1x10       Assessment & Plan   Assessment  Husam presents with a condition of Moderate complexity.   Presentation of Symptoms: Uncomplicated  Will Comorbidities Impact Care: No     Functional Limitations: Activity tolerance, Disrupted sleep pattern, Pain with ADLs/IADLs, Painful locomotion/ambulation, Participating in leisure activities, Range of motion, Sitting tolerance, Standing tolerance, Functional mobility  Impairments: Abnormal or restricted range of motion, Impaired physical strength, Pain with functional activity  Personal Factors Affecting Prognosis: Pain    Patient Goal for Therapy (PT): decreased pain  Prognosis: Good    Plan  From a physical therapy perspective, the patient would benefit from: Skilled Rehab Services    Planned therapy interventions include: Therapeutic exercise, Therapeutic activities, Neuromuscular re-education, and Manual therapy.    Planned modalities to include: Electrical stimulation - attended, Cryotherapy " (cold pack), Electrical stimulation - passive/unattended, and Thermotherapy (hot pack).        Visit Frequency: 2 times Per Week for 8 Weeks.  This plan was discussed with Patient.   Discussion participants: Agreed Upon Plan of Care    Patient's spiritual, cultural, and educational needs considered and patient agreeable to plan of care and goals.       Goals:   Active       Long Term Goals       Patient will demonstrate improved function as indicated by a functional score of 66 on the FOTO.        Start:  05/15/25    Expected End:  07/11/25            Pt will demonstrate improved pain by reports of less than or equal to 5/10 worst pain on the verbal rating scale in order to progress toward maximal functional ability and improve QOL.         Start:  05/15/25    Expected End:  07/11/25            Patient will improve lumbar range of motion to within normal limits with less than 3/10 pain for improved functional QOL.        Start:  05/15/25    Expected End:  07/11/25            Patient will improve bilateral lower extremity strength to 5/5 MMT for return to prior level of function.        Start:  05/15/25    Expected End:  07/11/25               Short Term Goals       Patient will demonstrate independence with HEP in order to progress toward functional independence        Start:  05/15/25    Expected End:  07/11/25            Pt will demonstrate improved pain by reports of less than or equal to 7/10 worst pain on the verbal rating scale in order to progress toward maximal functional ability and improve QOL.         Start:  05/15/25    Expected End:  07/11/25            Patient will reports decreased pain in the mornings for improved functional QOL       Start:  05/15/25    Expected End:  07/11/25                Mayela Rodriguez, PT

## 2025-05-22 ENCOUNTER — CLINICAL SUPPORT (OUTPATIENT)
Dept: REHABILITATION | Facility: HOSPITAL | Age: 50
End: 2025-05-22
Payer: COMMERCIAL

## 2025-05-22 DIAGNOSIS — M54.50 CHRONIC BILATERAL LOW BACK PAIN WITHOUT SCIATICA: Primary | ICD-10-CM

## 2025-05-22 DIAGNOSIS — G89.29 CHRONIC BILATERAL LOW BACK PAIN WITHOUT SCIATICA: Primary | ICD-10-CM

## 2025-05-22 PROCEDURE — 97110 THERAPEUTIC EXERCISES: CPT | Mod: PN

## 2025-05-22 PROCEDURE — 97112 NEUROMUSCULAR REEDUCATION: CPT | Mod: PN

## 2025-05-22 PROCEDURE — 97140 MANUAL THERAPY 1/> REGIONS: CPT | Mod: PN

## 2025-05-22 NOTE — PROGRESS NOTES
"Outpatient Rehab  Physical Therapy Visit    Patient Name: Husam Huerta  MRN: [id number]  YOB: 1975  Encounter Date: 5/22/2025    Therapy Diagnosis:   Encounter Diagnosis   Name Primary?    Chronic bilateral low back pain without sciatica Yes     Physician: Ladan Monroe MD    Physician Orders: Eval and Treat  Medical Diagnosis: Low back pain  Radiculopathy, lumbar region  Spinal stenosis, lumbar region, with neurogenic claudication    Visit # / Visits Authorized:  1 / 20  Insurance Authorization Period: 5/20/2025 to 12/31/2025  Date of Evaluation: 5/15/2025  Plan of Care Certification: 5/16/2025 to 7/11/2025      PT/PTA: PT   Number of PTA visits since last PT visit:0    Time In: 1000   Time Out: 1100  Total Time (in minutes): 60   Total Billable Time (in minutes):      Precautions: Standard       Subjective   having much right sided tightness and low back pain today..       Objective    Last assessed on eval (5/15/25)     Treatment:    Therapeutic Exercise: 40 minutes  Bike x 8 minutes  3-way forward flexion  Bridges 3x10  SLR 2x10 ea  Side-lying clams, GTB 20x ea  Hamstring stretch 3 x30"  Piriformis stretch 3x30"    Manual Therapy: 10 minutes  FDN to R lumbar paraspinals      Balance/Neuromuscular Re-Education: 10 minutes  Deadbugs 2x10  Shotgun x2 minutes    Therapeutic Activity       Assessment & Plan   Assessment: patient presents to his first follow-up after initial evaluation where POC was initiated. interventions focused on lumbar mobility and stabilization. Much tightness noted with bridges and SLR exercsies but able to complete all repetitions. Following [facility] verbal and written consent, FDN was performed to right low back with good tolerance. Slight relief noted following. Educated to apply moist heat for decreased soreness. Will inquire response at next session.     The patient will continue to benefit from skilled outpatient physical therapy in order to address the deficits listed in " the problem list on the initial evaluation, provide patient and family education, and maximize the patients level of independence in the home and community environments.     The patient's spiritual, cultural, and educational needs were considered, and the patient is agreeable to the plan of care and goals.         Plan: Continue with POC and progress as toelrated.    Goals:   Active       Long Term Goals       Patient will demonstrate improved function as indicated by a functional score of 66 on the FOTO.        Start:  05/15/25    Expected End:  07/11/25            Pt will demonstrate improved pain by reports of less than or equal to 5/10 worst pain on the verbal rating scale in order to progress toward maximal functional ability and improve QOL.         Start:  05/15/25    Expected End:  07/11/25            Patient will improve lumbar range of motion to within normal limits with less than 3/10 pain for improved functional QOL.        Start:  05/15/25    Expected End:  07/11/25            Patient will improve bilateral lower extremity strength to 5/5 MMT for return to prior level of function.        Start:  05/15/25    Expected End:  07/11/25               Short Term Goals       Patient will demonstrate independence with HEP in order to progress toward functional independence        Start:  05/15/25    Expected End:  07/11/25            Pt will demonstrate improved pain by reports of less than or equal to 7/10 worst pain on the verbal rating scale in order to progress toward maximal functional ability and improve QOL.         Start:  05/15/25    Expected End:  07/11/25            Patient will reports decreased pain in the mornings for improved functional QOL       Start:  05/15/25    Expected End:  07/11/25                Mayela Rodriguez, PT

## 2025-05-27 ENCOUNTER — CLINICAL SUPPORT (OUTPATIENT)
Dept: REHABILITATION | Facility: HOSPITAL | Age: 50
End: 2025-05-27
Payer: COMMERCIAL

## 2025-05-27 DIAGNOSIS — G89.29 CHRONIC BILATERAL LOW BACK PAIN WITHOUT SCIATICA: Primary | ICD-10-CM

## 2025-05-27 DIAGNOSIS — M54.50 CHRONIC BILATERAL LOW BACK PAIN WITHOUT SCIATICA: Primary | ICD-10-CM

## 2025-05-27 PROCEDURE — 97112 NEUROMUSCULAR REEDUCATION: CPT | Mod: PN

## 2025-05-27 PROCEDURE — 97110 THERAPEUTIC EXERCISES: CPT | Mod: PN

## 2025-05-27 NOTE — PROGRESS NOTES
"Outpatient Rehab  Physical Therapy Visit    Patient Name: Husam Huerta  MRN: [id number]  YOB: 1975  Encounter Date: 5/27/2025    Therapy Diagnosis:   Encounter Diagnosis   Name Primary?    Chronic bilateral low back pain without sciatica Yes     Physician: Ladan Monroe MD    Physician Orders: Eval and Treat  Medical Diagnosis: Low back pain  Radiculopathy, lumbar region  Spinal stenosis, lumbar region, with neurogenic claudication    Visit # / Visits Authorized:  2 / 20  Insurance Authorization Period: 5/20/2025 to 12/31/2025  Date of Evaluation: 5/15/2025  Plan of Care Certification: 5/16/2025 to 7/11/2025      PT/PTA: PT   Number of PTA visits since last PT visit:0    Time In: 0900   Time Out: 0950  Total Time (in minutes): 50   Total Billable Time (in minutes):  50 minutes (pt had to leave early)     Precautions: Standard     Subjective   Feeling overall good today. He had much soreness for about two days after his last session, but was able to mow the grass yesterday with no problems..       Objective    Last assessed on eval (5/15/25)     Treatment:    Therapeutic Exercise: 25 minutes  Bike x 8 minutes  3-way forward flexion  LTR with SB 20x  DKTC with SB 20x   Bridges 3x10  SLR 2x10 ea  Side-lying clams, GTB 20x ea  Hamstring stretch 3 x30"  Piriformis stretch 3x30"    Manual Therapy: 0 minutes  FDN to R lumbar paraspinals    Balance/Neuromuscular Re-Education: 25 minutes  Deadbugs 2x10  Shotgun x2 minutes  Tandem stance 2x30"  Hip 3-way 1 x10   Palloff press, 10# 2x10ea  Around the worlds, 10# 10x ea  Standing marches, 10# 2x10    Therapeutic Activity       Assessment & Plan   Assessment: He presents with decreased voerall low back pain. Interventions focused on lumbar mobility and stabilization. Much tightness noted with bridges and SLR exercsies but able to complete all repetitions. Following [place] verbal and written consent, FDN was performed to right low back with good tolerance. Slight " relief noted following. Educated to apply moist heat for decreased soreness. Will inquire response at next session.     The patient will continue to benefit from skilled outpatient physical therapy in order to address the deficits listed in the problem list on the initial evaluation, provide patient and family education, and maximize the patients level of independence in the home and community environments.     The patient's spiritual, cultural, and educational needs were considered, and the patient is agreeable to the plan of care and goals.         Plan: Continue with POC and progress as toelrated.    Goals:   Active       Long Term Goals       Patient will demonstrate improved function as indicated by a functional score of 66 on the FOTO.        Start:  05/15/25    Expected End:  07/11/25            Pt will demonstrate improved pain by reports of less than or equal to 5/10 worst pain on the verbal rating scale in order to progress toward maximal functional ability and improve QOL.         Start:  05/15/25    Expected End:  07/11/25            Patient will improve lumbar range of motion to within normal limits with less than 3/10 pain for improved functional QOL.        Start:  05/15/25    Expected End:  07/11/25            Patient will improve bilateral lower extremity strength to 5/5 MMT for return to prior level of function.        Start:  05/15/25    Expected End:  07/11/25               Short Term Goals       Patient will demonstrate independence with HEP in order to progress toward functional independence        Start:  05/15/25    Expected End:  07/11/25            Pt will demonstrate improved pain by reports of less than or equal to 7/10 worst pain on the verbal rating scale in order to progress toward maximal functional ability and improve QOL.         Start:  05/15/25    Expected End:  07/11/25            Patient will reports decreased pain in the mornings for improved functional QOL       Start:   05/15/25    Expected End:  07/11/25                Mayela Rodriguez, PT  05/27/2025

## 2025-05-29 ENCOUNTER — CLINICAL SUPPORT (OUTPATIENT)
Dept: REHABILITATION | Facility: HOSPITAL | Age: 50
End: 2025-05-29
Payer: COMMERCIAL

## 2025-05-29 DIAGNOSIS — M54.50 CHRONIC BILATERAL LOW BACK PAIN WITHOUT SCIATICA: Primary | ICD-10-CM

## 2025-05-29 DIAGNOSIS — G89.29 CHRONIC BILATERAL LOW BACK PAIN WITHOUT SCIATICA: Primary | ICD-10-CM

## 2025-05-29 PROCEDURE — 97140 MANUAL THERAPY 1/> REGIONS: CPT | Mod: PN

## 2025-05-29 PROCEDURE — 97112 NEUROMUSCULAR REEDUCATION: CPT | Mod: PN

## 2025-05-29 PROCEDURE — 97110 THERAPEUTIC EXERCISES: CPT | Mod: PN

## 2025-05-29 NOTE — PROGRESS NOTES
"Outpatient Rehab  Physical Therapy Visit    Patient Name: Husam Huerta  MRN: [id number]  YOB: 1975  Encounter Date: 5/29/2025    Therapy Diagnosis:   Encounter Diagnosis   Name Primary?    Chronic bilateral low back pain without sciatica Yes     Physician: Ladan Monroe MD    Physician Orders: Eval and Treat  Medical Diagnosis: Low back pain  Radiculopathy, lumbar region  Spinal stenosis, lumbar region, with neurogenic claudication    Visit # / Visits Authorized:  3 / 20  Insurance Authorization Period: 5/20/2025 to 12/31/2025  Date of Evaluation: 5/15/2025  Plan of Care Certification: 5/16/2025 to 7/11/2025      PT/PTA: PT   Number of PTA visits since last PT visit:0    Time In: 1000   Time Out: 1100  Total Time (in minutes): 60   Total Billable Time (in minutes):  60 minutes   Precautions: Standard     Subjective   Having a good day today..       Objective    Last assessed on eval (5/15/25)     Treatment:    Therapeutic Exercise: 25 minutes  Bike x 8 minutes  3-way forward flexion  LTR with SB 20x  DKTC with SB 20x   Bridges 3x10  SLR 2x10 ea  Side-lying clams, GTB 20x ea  Hamstring stretch 3 x30"  Piriformis stretch 3x30"    Manual Therapy: 10 minutes  FDN to R lumbar paraspinals  Cupping/IASTM/STM to (B) lumbar paraspinals    Balance/Neuromuscular Re-Education: 25 minutes  Deadbugs 2x10  Shotgun x2 minutes  Tandem stance 2x30"  SL stance 2x30"  Hip 3-way 1 x10   Lateral stepping, YTB x 3 laps  Palloff press, 10# 2x10ea  Around the worlds, 10# 10x ea  Standing marches, 10# 2x10    Therapeutic Activity       Assessment & Plan   Assessment: He presents with decreased low back pain. Interventions focused on lumbar mobility and stabilization. Added in more standing functional strengthening exercises with good amounts of fatigue and slight tightness noted in his back. Ended session with manuals focused on pain modulation with some relief of pain noted. Will inquire response at next " session.  Evaluation/Treatment Tolerance: Patient tolerated treatment well  The patient will continue to benefit from skilled outpatient physical therapy in order to address the deficits listed in the problem list on the initial evaluation, provide patient and family education, and maximize the patients level of independence in the home and community environments.     The patient's spiritual, cultural, and educational needs were considered, and the patient is agreeable to the plan of care and goals.         Plan: Continue with POC and progress as tolerated.    Goals:   Active       Long Term Goals       Patient will demonstrate improved function as indicated by a functional score of 66 on the FOTO.        Start:  05/15/25    Expected End:  07/11/25            Pt will demonstrate improved pain by reports of less than or equal to 5/10 worst pain on the verbal rating scale in order to progress toward maximal functional ability and improve QOL.         Start:  05/15/25    Expected End:  07/11/25            Patient will improve lumbar range of motion to within normal limits with less than 3/10 pain for improved functional QOL.        Start:  05/15/25    Expected End:  07/11/25            Patient will improve bilateral lower extremity strength to 5/5 MMT for return to prior level of function.        Start:  05/15/25    Expected End:  07/11/25               Short Term Goals       Patient will demonstrate independence with HEP in order to progress toward functional independence        Start:  05/15/25    Expected End:  07/11/25            Pt will demonstrate improved pain by reports of less than or equal to 7/10 worst pain on the verbal rating scale in order to progress toward maximal functional ability and improve QOL.         Start:  05/15/25    Expected End:  07/11/25            Patient will reports decreased pain in the mornings for improved functional QOL       Start:  05/15/25    Expected End:  07/11/25                 Mayela Rodriguez, PT

## 2025-06-03 ENCOUNTER — CLINICAL SUPPORT (OUTPATIENT)
Dept: REHABILITATION | Facility: HOSPITAL | Age: 50
End: 2025-06-03
Payer: COMMERCIAL

## 2025-06-03 DIAGNOSIS — G89.29 CHRONIC BILATERAL LOW BACK PAIN WITHOUT SCIATICA: Primary | ICD-10-CM

## 2025-06-03 DIAGNOSIS — M54.50 CHRONIC BILATERAL LOW BACK PAIN WITHOUT SCIATICA: Primary | ICD-10-CM

## 2025-06-03 PROCEDURE — 97112 NEUROMUSCULAR REEDUCATION: CPT | Mod: PN

## 2025-06-03 PROCEDURE — 97140 MANUAL THERAPY 1/> REGIONS: CPT | Mod: PN

## 2025-06-03 PROCEDURE — 97110 THERAPEUTIC EXERCISES: CPT | Mod: PN

## 2025-06-03 PROCEDURE — 97530 THERAPEUTIC ACTIVITIES: CPT | Mod: PN

## 2025-06-05 ENCOUNTER — CLINICAL SUPPORT (OUTPATIENT)
Dept: REHABILITATION | Facility: HOSPITAL | Age: 50
End: 2025-06-05
Payer: COMMERCIAL

## 2025-06-05 DIAGNOSIS — G89.29 CHRONIC BILATERAL LOW BACK PAIN WITHOUT SCIATICA: Primary | ICD-10-CM

## 2025-06-05 DIAGNOSIS — M54.50 CHRONIC BILATERAL LOW BACK PAIN WITHOUT SCIATICA: Primary | ICD-10-CM

## 2025-06-05 PROCEDURE — 97140 MANUAL THERAPY 1/> REGIONS: CPT | Mod: PN

## 2025-06-05 PROCEDURE — 97110 THERAPEUTIC EXERCISES: CPT | Mod: PN

## 2025-06-05 PROCEDURE — 97112 NEUROMUSCULAR REEDUCATION: CPT | Mod: PN

## 2025-06-05 PROCEDURE — 97530 THERAPEUTIC ACTIVITIES: CPT | Mod: PN

## 2025-06-10 ENCOUNTER — CLINICAL SUPPORT (OUTPATIENT)
Dept: REHABILITATION | Facility: HOSPITAL | Age: 50
End: 2025-06-10
Payer: COMMERCIAL

## 2025-06-10 DIAGNOSIS — M54.50 CHRONIC BILATERAL LOW BACK PAIN WITHOUT SCIATICA: Primary | ICD-10-CM

## 2025-06-10 DIAGNOSIS — G89.29 CHRONIC BILATERAL LOW BACK PAIN WITHOUT SCIATICA: Primary | ICD-10-CM

## 2025-06-10 PROCEDURE — 97110 THERAPEUTIC EXERCISES: CPT | Mod: PN

## 2025-06-10 PROCEDURE — 97530 THERAPEUTIC ACTIVITIES: CPT | Mod: PN

## 2025-06-10 PROCEDURE — 97112 NEUROMUSCULAR REEDUCATION: CPT | Mod: PN

## 2025-06-10 PROCEDURE — 97140 MANUAL THERAPY 1/> REGIONS: CPT | Mod: PN

## 2025-06-10 NOTE — PROGRESS NOTES
"Outpatient Rehab  Physical Therapy Visit    Patient Name: Husam Huerta  MRN: [id number]  YOB: 1975  Encounter Date: 6/10/2025    Therapy Diagnosis:   Encounter Diagnosis   Name Primary?    Chronic bilateral low back pain without sciatica Yes     Physician: Ladan Monroe MD    Physician Orders: Eval and Treat  Medical Diagnosis: Low back pain  Radiculopathy, lumbar region  Spinal stenosis, lumbar region, with neurogenic claudication    Visit # / Visits Authorized:  6 / 20  Insurance Authorization Period: 5/20/2025 to 12/31/2025  Date of Evaluation: 5/15/2025  Plan of Care Certification: 5/16/2025 to 7/11/2025      PT/PTA: PT   Number of PTA visits since last PT visit:0    Time In: 0730   Time Out: 0830  Total Time (in minutes): 60   Total Billable Time (in minutes):  60 minutes   Precautions: Standard     Subjective   feeling good today. He had same amount of soreness following dry needling, about 48 hours, before having pain relief..       Objective    Last assessed on eval (5/15/25)     Treatment:    Therapeutic Exercise: 20 minutes  Bike x 8 minutes  3-way forward flexion  Calf stretch 3x30"  LTR with SB 20x  DKTC with SB 20x   Bridges with SB 3x10  SLR 2x10 ea  Side-lying clams, GTB 20x ea  Hamstring stretch 3 x30"  Piriformis stretch 3x30"    Manual Therapy: 10 minutes  FDN to R lumbar paraspinals  Cupping/IASTM/STM to (R) lumbar paraspinals  Manual (R)LE stretching    Balance/Neuromuscular Re-Education: 20 minutes  Deadbugs 2x10  Shotgun x2 minutes  Tandem stance 2x30"  SL stance 2x30"  Hip 3-way, RTB 2 x10   Lateral stepping, RTB x 3 laps  Monster walks, RTB x3laps  Standing marches, YTB 2x10  Palloff press, 10# 2x10ea  Tall kneeling banded chop, Black TB 20x ea  Around the worlds, 10# 10x ea  Standing marches, 10# 2x10    Therapeutic Activity: 10 minutes  Step up with march 2x10  Lateral Step ups 2x10  Sit to stand (foam on chair) 2x10  Leg press machine 44# 2x10       Assessment & Plan "   Assessment: Progressed more dynamic core exercsies today with good tolerance. God amount of fatigue noted with standing hip exercises. Still with most difficutly with sit to stand exercise due to pain. Will continue to benefit from further hip and core strengthening for decreased low back pain. Will inquire response at next session.  Evaluation/Treatment Tolerance: Patient tolerated treatment well  The patient will continue to benefit from skilled outpatient physical therapy in order to address the deficits listed in the problem list on the initial evaluation, provide patient and family education, and maximize the patients level of independence in the home and community environments.     The patient's spiritual, cultural, and educational needs were considered, and the patient is agreeable to the plan of care and goals.         Plan: Continue with POC and progress as tolerated.    Goals:   Active       Long Term Goals       Patient will demonstrate improved function as indicated by a functional score of 66 on the FOTO.  (Progressing)       Start:  05/15/25    Expected End:  07/11/25            Pt will demonstrate improved pain by reports of less than or equal to 5/10 worst pain on the verbal rating scale in order to progress toward maximal functional ability and improve QOL.   (Progressing)       Start:  05/15/25    Expected End:  07/11/25            Patient will improve lumbar range of motion to within normal limits with less than 3/10 pain for improved functional QOL.  (Progressing)       Start:  05/15/25    Expected End:  07/11/25            Patient will improve bilateral lower extremity strength to 5/5 MMT for return to prior level of function.  (Progressing)       Start:  05/15/25    Expected End:  07/11/25               Short Term Goals       Patient will demonstrate independence with HEP in order to progress toward functional independence  (Progressing)       Start:  05/15/25    Expected End:  07/11/25             Pt will demonstrate improved pain by reports of less than or equal to 7/10 worst pain on the verbal rating scale in order to progress toward maximal functional ability and improve QOL.   (Progressing)       Start:  05/15/25    Expected End:  07/11/25            Patient will reports decreased pain in the mornings for improved functional QOL (Progressing)       Start:  05/15/25    Expected End:  07/11/25                Mayela Rodriguez PT

## 2025-06-12 ENCOUNTER — CLINICAL SUPPORT (OUTPATIENT)
Dept: REHABILITATION | Facility: HOSPITAL | Age: 50
End: 2025-06-12
Payer: COMMERCIAL

## 2025-06-12 DIAGNOSIS — M54.50 CHRONIC BILATERAL LOW BACK PAIN WITHOUT SCIATICA: Primary | ICD-10-CM

## 2025-06-12 DIAGNOSIS — G89.29 CHRONIC BILATERAL LOW BACK PAIN WITHOUT SCIATICA: Primary | ICD-10-CM

## 2025-06-12 PROCEDURE — 97110 THERAPEUTIC EXERCISES: CPT | Mod: PN

## 2025-06-12 PROCEDURE — 97140 MANUAL THERAPY 1/> REGIONS: CPT | Mod: PN

## 2025-06-12 PROCEDURE — 97112 NEUROMUSCULAR REEDUCATION: CPT | Mod: PN

## 2025-06-12 PROCEDURE — 97530 THERAPEUTIC ACTIVITIES: CPT | Mod: PN

## 2025-06-12 NOTE — PROGRESS NOTES
"Outpatient Rehab  Physical Therapy Visit    Patient Name: Husam Huerta  MRN: [id number]  YOB: 1975  Encounter Date: 6/12/2025    Therapy Diagnosis:   Encounter Diagnosis   Name Primary?    Chronic bilateral low back pain without sciatica Yes     Physician: Ladan Monroe MD    Physician Orders: Eval and Treat  Medical Diagnosis: Low back pain  Radiculopathy, lumbar region  Spinal stenosis, lumbar region, with neurogenic claudication    Visit # / Visits Authorized:  7 / 20  Insurance Authorization Period: 5/20/2025 to 12/31/2025  Date of Evaluation: 5/15/2025  Plan of Care Certification: 5/16/2025 to 7/11/2025      PT/PTA: PT   Number of PTA visits since last PT visit:0    Time In: 1000   Time Out: 1100  Total Time (in minutes): 60   Total Billable Time (in minutes):  60 minutes   Precautions: Standard     Subjective   Feeling okay today.       Objective    Last assessed on eval (5/15/25)     Treatment:    Therapeutic Exercise: 20 minutes  Bike x 8 minutes  3-way forward flexion  Calf stretch 3x30"  LTR with SB 20x  DKTC with SB 20x   Bridges with adduction 3x10  SLR 2x10 ea  Side-lying clams, GTB 20x ea  Hamstring stretch 3 x30"  Piriformis stretch 3x30"    Manual Therapy: 10 minutes  FDN to R lumbar paraspinals  Cupping/IASTM/STM to (R) lumbar paraspinals  Manual (R)LE stretching    Balance/Neuromuscular Re-Education: 20 minutes  Deadbugs 2x10  Shotgun x2 minutes  Tandem stance 2x30"  SL stance 2x30"  Hip 3-way, RTB 2 x10   Lateral stepping, RTB x 3 laps  Monster walks, RTB x3laps  Standing marches, YTB 2x10  Palloff press, 10# 2x10ea  Tall kneeling banded chop, Black TB 20x ea  Around the worlds, 10# 10x ea  Standing marches, 10# 2x10    Therapeutic Activity: 10 minutes  Step up with march 2x10  Lateral Step ups 2x10  Sit to stand (foam on chair) 2x10  Leg press machine 44# 2x10       Assessment & Plan   Assessment: Will continue to benefit from further hip and core strengthening for decreased low " back pain. Will inquire response at next session.  Evaluation/Treatment Tolerance: Patient tolerated treatment well  The patient will continue to benefit from skilled outpatient physical therapy in order to address the deficits listed in the problem list on the initial evaluation, provide patient and family education, and maximize the patients level of independence in the home and community environments.     The patient's spiritual, cultural, and educational needs were considered, and the patient is agreeable to the plan of care and goals.         Plan: Continue with POC and progress as tolerated.    Goals:   Active       Long Term Goals       Patient will demonstrate improved function as indicated by a functional score of 66 on the FOTO.  (Progressing)       Start:  05/15/25    Expected End:  07/11/25            Pt will demonstrate improved pain by reports of less than or equal to 5/10 worst pain on the verbal rating scale in order to progress toward maximal functional ability and improve QOL.   (Progressing)       Start:  05/15/25    Expected End:  07/11/25            Patient will improve lumbar range of motion to within normal limits with less than 3/10 pain for improved functional QOL.  (Progressing)       Start:  05/15/25    Expected End:  07/11/25            Patient will improve bilateral lower extremity strength to 5/5 MMT for return to prior level of function.  (Progressing)       Start:  05/15/25    Expected End:  07/11/25               Short Term Goals       Patient will demonstrate independence with HEP in order to progress toward functional independence  (Progressing)       Start:  05/15/25    Expected End:  07/11/25            Pt will demonstrate improved pain by reports of less than or equal to 7/10 worst pain on the verbal rating scale in order to progress toward maximal functional ability and improve QOL.   (Progressing)       Start:  05/15/25    Expected End:  07/11/25            Patient will  reports decreased pain in the mornings for improved functional QOL (Progressing)       Start:  05/15/25    Expected End:  07/11/25                Mayela Rodriguez PT